# Patient Record
Sex: MALE | Race: WHITE | Employment: FULL TIME
[De-identification: names, ages, dates, MRNs, and addresses within clinical notes are randomized per-mention and may not be internally consistent; named-entity substitution may affect disease eponyms.]

---

## 2022-08-29 ENCOUNTER — OFFICE VISIT (OUTPATIENT)
Dept: URGENT CARE | Facility: CLINIC | Age: 42
End: 2022-08-29
Payer: OTHER GOVERNMENT

## 2022-08-29 VITALS
WEIGHT: 235 LBS | RESPIRATION RATE: 17 BRPM | HEIGHT: 73 IN | DIASTOLIC BLOOD PRESSURE: 70 MMHG | TEMPERATURE: 98.1 F | BODY MASS INDEX: 31.14 KG/M2 | OXYGEN SATURATION: 97 % | HEART RATE: 76 BPM | SYSTOLIC BLOOD PRESSURE: 100 MMHG

## 2022-08-29 DIAGNOSIS — H10.32 ACUTE CONJUNCTIVITIS OF LEFT EYE, UNSPECIFIED ACUTE CONJUNCTIVITIS TYPE: Primary | ICD-10-CM

## 2022-08-29 PROCEDURE — 99213 OFFICE O/P EST LOW 20 MIN: CPT | Performed by: PHYSICIAN ASSISTANT

## 2022-08-29 RX ORDER — OFLOXACIN 3 MG/ML
1 SOLUTION/ DROPS OPHTHALMIC 4 TIMES DAILY
Qty: 5 ML | Refills: 0 | Status: SHIPPED | OUTPATIENT
Start: 2022-08-29 | End: 2022-09-08

## 2022-08-29 ASSESSMENT — ENCOUNTER SYMPTOMS
SORE THROAT: 0
NAUSEA: 0
ABDOMINAL PAIN: 0
VOMITING: 0
SHORTNESS OF BREATH: 0
CHEST TIGHTNESS: 0
DIARRHEA: 0
COUGH: 0

## 2022-08-29 ASSESSMENT — VISUAL ACUITY: OU: 1

## 2022-08-29 NOTE — PROGRESS NOTES
Master Ron (: 1980) is a 35 y.o. male is here for evaluation of the following chief complaint(s):  Chief Complaint   Patient presents with    Conjunctivitis     X 2 day itchy watery eye left eye        ASSESSMENT/PLAN:  Below is the assessment and plan developed based on review of pertinent history, physical exam, labs, studies, and medications. Timothy Fonseca was seen today for conjunctivitis. Diagnoses and all orders for this visit:    Acute conjunctivitis of left eye, unspecified acute conjunctivitis type  -     ofloxacin (OCUFLOX) 0.3 % solution; Place 1 drop into the left eye 4 times daily for 10 days     Ofloxacin drops as prescribed. Patient to avoid wearing contact lenses until symptoms resolve. Symptomatic treatment discussed. Strict ER precautions given. Patient to follow up with PCP as discussed. Patient to return to clinic if symptoms persist or worsen. We discussed reasons to present to the ER or call 911, including but not limited to headache, blurred vision, speech disturbance, difficulty with ambulation/gait, numbness, tingling, weakness, chest pain, shortness of breath, syncope. Patient verbalizes understanding and agreement. SUBJECTIVE/OBJECTIVE:  Patient is a 34 y/o male who presents today endorsing possible pink eye to his left eye. He endorses 2 days itching and drainage. He says he wakes up with his eye crusted shut. He does wear contact lenses that he changes daily. Patient denies blurry vision or loss of vision, pain with extraocular movements. Patient denies fever, chills, chest pain, shortness of breath, nausea, vomiting, diarrhea, abdominal or back pain, lightheadedness, dizziness, weakness. Allergies   Allergen Reactions    Penicillins      No past medical history on file. No past surgical history on file. No family history on file. Social Connections: Not on file          Review of Systems   Constitutional:  Negative for chills and fever. HENT:  Negative for sore throat. Respiratory:  Negative for cough, chest tightness and shortness of breath. Cardiovascular:  Negative for chest pain, palpitations and leg swelling. Gastrointestinal:  Negative for abdominal pain, diarrhea, nausea and vomiting. Skin:  Negative for rash. Neurological:  Negative for dizziness, weakness, light-headedness and headaches. /70 (Site: Right Upper Arm, Position: Sitting, Cuff Size: Large Adult)   Pulse 76   Temp 98.1 °F (36.7 °C) (Oral)   Resp 17   Ht 6' 1\" (1.854 m)   Wt 235 lb (106.6 kg)   SpO2 97%   BMI 31.00 kg/m²      Physical Exam  Constitutional:       Appearance: Normal appearance. HENT:      Head: Normocephalic and atraumatic. Eyes:      General: Lids are normal. Lids are everted, no foreign bodies appreciated. Vision grossly intact. Gaze aligned appropriately. Right eye: No foreign body, discharge or hordeolum. Left eye: Discharge present. No foreign body or hordeolum. Extraocular Movements: Extraocular movements intact. Conjunctiva/sclera:      Right eye: Right conjunctiva is not injected. Left eye: Left conjunctiva is injected. Cardiovascular:      Rate and Rhythm: Normal rate and regular rhythm. Pulses: Normal pulses. Heart sounds: Normal heart sounds. Pulmonary:      Effort: Pulmonary effort is normal.      Breath sounds: Normal breath sounds. Skin:     General: Skin is warm and dry. Neurological:      General: No focal deficit present. Mental Status: He is alert. Psychiatric:         Mood and Affect: Mood normal.         Behavior: Behavior normal.         An electronic signature was used to authenticate this note.   -- VERONIKA Guy

## 2022-11-07 ENCOUNTER — NURSE ONLY (OUTPATIENT)
Dept: PULMONOLOGY | Age: 42
End: 2022-11-07
Payer: OTHER GOVERNMENT

## 2022-11-07 DIAGNOSIS — R05.9 COUGH, UNSPECIFIED TYPE: Primary | ICD-10-CM

## 2022-11-07 LAB
FEV 1 , POC: 4.64 L
FEV1 % PRED, POC: 105 %
FEV1/FVC, POC: 78
FVC % PRED, POC: 108 %
FVC, POC: 5.98
TOTAL HEMOGLOBIN (SPHB), POC: 16.7 G/DL

## 2022-11-07 PROCEDURE — 94060 EVALUATION OF WHEEZING: CPT | Performed by: INTERNAL MEDICINE

## 2022-11-07 PROCEDURE — 94726 PLETHYSMOGRAPHY LUNG VOLUMES: CPT | Performed by: INTERNAL MEDICINE

## 2022-11-07 PROCEDURE — 88738 HGB QUANT TRANSCUTANEOUS: CPT | Performed by: INTERNAL MEDICINE

## 2022-11-07 PROCEDURE — 94729 DIFFUSING CAPACITY: CPT | Performed by: INTERNAL MEDICINE

## 2022-11-07 ASSESSMENT — PULMONARY FUNCTION TESTS
FVC_POC: 5.98
FEV1/FVC_POC: 78
FVC_PERCENT_PREDICTED_POC: 108
FEV1_PERCENT_PREDICTED_POC: 105

## 2023-02-08 LAB
AVERAGE GLUCOSE: NORMAL
HBA1C MFR BLD: 4.7 %

## 2023-03-16 ENCOUNTER — OFFICE VISIT (OUTPATIENT)
Dept: ENT CLINIC | Age: 43
End: 2023-03-16
Payer: OTHER GOVERNMENT

## 2023-03-16 VITALS
SYSTOLIC BLOOD PRESSURE: 122 MMHG | HEIGHT: 73 IN | BODY MASS INDEX: 33.74 KG/M2 | WEIGHT: 254.6 LBS | DIASTOLIC BLOOD PRESSURE: 80 MMHG

## 2023-03-16 DIAGNOSIS — R05.3 CHRONIC COUGH: ICD-10-CM

## 2023-03-16 DIAGNOSIS — J30.1 NON-SEASONAL ALLERGIC RHINITIS DUE TO POLLEN: ICD-10-CM

## 2023-03-16 DIAGNOSIS — R09.82 POSTNASAL DRIP: Primary | ICD-10-CM

## 2023-03-16 PROCEDURE — 31575 DIAGNOSTIC LARYNGOSCOPY: CPT | Performed by: PHYSICIAN ASSISTANT

## 2023-03-16 PROCEDURE — 99204 OFFICE O/P NEW MOD 45 MIN: CPT | Performed by: PHYSICIAN ASSISTANT

## 2023-03-16 RX ORDER — FLUTICASONE PROPIONATE 110 UG/1
1 AEROSOL, METERED RESPIRATORY (INHALATION) DAILY
COMMUNITY

## 2023-03-16 RX ORDER — AZELASTINE HYDROCHLORIDE 137 UG/1
1 SPRAY, METERED NASAL 2 TIMES DAILY
Qty: 1 EACH | Refills: 2 | Status: SHIPPED | OUTPATIENT
Start: 2023-03-16

## 2023-03-16 RX ORDER — TESTOSTERONE CYPIONATE 200 MG/ML
200 INJECTION INTRAMUSCULAR ONCE
COMMUNITY

## 2023-03-16 RX ORDER — ANASTROZOLE 1 MG/1
1 TABLET ORAL DAILY
COMMUNITY

## 2023-03-16 ASSESSMENT — ENCOUNTER SYMPTOMS
COUGH: 0
ABDOMINAL PAIN: 0
EYE DISCHARGE: 0

## 2023-03-16 NOTE — PROGRESS NOTES
Charisma Barrientos is a 38 y.o. male presents today with c/o chronic cough and throat tightness. In January 2020 the patient had a severe upper respiratory infection which included high fevers and the sensation of his lungs being on fire. He had antibodies checked for COVID few months later and was negative. But he had about 2 months of persistent coughing. He was given an inhaler of albuterol which did improve his cough. But ever since every so often it will feel like the back of his throat will tighten as he points under his jaw and feels like it \"is hard to breathe in \"he gets a intermittent but harsh throat clearing cough which will then trigger a scratchy itchy on the back of his uvula which will cause him to cough more. He believes this is from his postnasal drainage. It is not triggered by smells or environmental changes. Warm liquids will help. He largely feels a buildup of phlegm on his larynx. He denies heartburn or regurgitation. He has no dysphagia or dyspnea. He was seen by pulmonary and given pulmonary function tests which the patient states were within normal limits. He was placed on a inhaler, which he uses about 4 times a week in the mornings, advair. He admits to significant nasal allergies and was tested as a child allergic to molds. He has not been tested in recent years and did not do immunotherapy. He does not utilize antihistamines or intranasal steroids. He utilizes albuterol only when sick for severe congestion. As he is a  he keeps close tabs on his head. Patient also recounts perforating what he believes is his left eardrum during a hunting trip to [place] when a stick penetrated his TM. It resolved fully with time. Chief Complaint   Patient presents with    New Patient    Other     Patient here for throat issues. There is no problem list on file for this patient.        Reviewed and updated this visit by provider:  Tobacco  Allergies  Meds  Problems Med Hx  Surg Hx  Fam Hx         Review of Systems   Constitutional:  Negative for fever. HENT:  Negative for ear discharge and ear pain. Eyes:  Negative for discharge. Respiratory:  Negative for cough. Gastrointestinal:  Negative for abdominal pain. Musculoskeletal:  Negative for neck pain. Skin:  Negative for rash. Allergic/Immunologic: Negative for environmental allergies. Neurological:  Negative for dizziness. Hematological:  Negative for adenopathy. /80 (Site: Left Upper Arm, Position: Sitting)   Ht 6' 1\" (1.854 m)   Wt 254 lb 9.6 oz (115.5 kg)   BMI 33.59 kg/m²     Physical Exam:    General: Well developed, well nourished, in no acute distress  Communication: The patient communicates appropriately for their age. Voice: Normal. Pt did not cough during our visit. Head, Face, and Salivary Glands: No head or facial abnormalities present, No masses or lesions present, Overall appearance is normal, No abnormality of parotid or submandibular glands present. TMJ joint: no crepitus, or deviation. External Ears: appearance is normal with no scars, lesions or masses. Right Ear:  Canals is normal , Tympanic membrane with normal landmarks and normal mobility, no retraction, inflammation, or effusion. Left  Ear: Canal is normal , Tympanic membrane with normal landmarks and normal mobility, no retraction, inflammation, or effusion. Nose/Nasal Cavity: Nasal mucosa is pink/ moist.  Nasal septum is deviated left mild with spur mid septum. Inferior turbinates are Hypertrophic. Both nasal passages are clear, no polyps or abnormal drainage. Lips/Gums/Teeth: Inspection of lips, gums and teeth are normal  Oral Cavity: normal oral mucosa, no visualized ulcers, masses or other lesions,  Palate normal, Tongue normal, Floor of mouth normal. Mallampati Class 1 . Oropharynx: Oropharynx normal and unobstructed, tonsils are surgically absent  no lesion or inflammation. Neck/Thyroid: Normal appearance without mass, Trachea midline, No lymphadenopathy, No enlargement, tenderness or mass of thyroid noted. Procedure: Procedure Flex: Diagnostic Flexible Laryngoscopy     Findings:   Nose: normal turbinates and middle meatus bilaterally    Nasopharynx: normal eustachian tube, demarco, and fossa of Rosenmueler bilaterally    Hypopharynx: normal including the vallecula, piriform sinuses, base of tongue, and postcricoid area. no erythema or edema. Glottis and Laryngeal Motor exam:  No evidence of vocal fold weakness. normal abduction, adduction, and stretch. no mucosal lesion. some edema of the  left fvc, but minimal erythema. Subglottis and Trachea:  no visible lesions    Indication: Adequate visualization of the larynx with connected speech not possible without endoscopic evaluation. Consent: The patient verbally consented to the recording of their face and upper aerodigestive tract. Anesthesia: Phenylephrine, Lidocaine    Details: A flexible fiberoptic laryngoscope was passed through the most patent nasal cavity into the nasopharynx which was examined. The scope was then passed into the oropharynx. The hypopharynx, base of tongue, vallecula, epiglottis, aryepiglottic folds, arytenoids, false vocal cords, true vocal cords, subglottic area, pyriform sinuses, and the post cricoid area was examined. Assessment/Plan:  1. Postnasal drip  -     [facility]FL - [facility]  2. Non-seasonal allergic rhinitis due to pollen  -     Azelastine HCl 137 MCG/SPRAY SOLN; 1 spray by Nasal route 2 times daily, Disp-1 each, R-2Normal  -     AFL - [facility]  3. Chronic cough  -     LARYNGOSCOPY,FLEX FIBER,DIAGNOSTIC  -     [facility] - [facility]      Pt has a long history of allergy, he has not been treated with INS or antihistamines in recent months.  He describes a throat closing sensation that happens like tightening under the mandible that was not reproduced on today's visit. He does have Pnd and on examination vc are moving well without LPR findings. Chronic cough is likely allergic and post infection mediated and the advair he is on, has helped. We will add Astelin and antihistamine to his regimen and get allergy tested. I would like to consider speech therapy for his cough if allergy does not help him significantly. Return in about 4 weeks (around 4/13/2023), or recheck cough, pnd, allergies. Patient agrees with this plan.     VERONIKA Gage

## 2023-04-19 ENCOUNTER — OFFICE VISIT (OUTPATIENT)
Dept: ENT CLINIC | Age: 43
End: 2023-04-19

## 2023-04-19 VITALS
DIASTOLIC BLOOD PRESSURE: 80 MMHG | WEIGHT: 249 LBS | BODY MASS INDEX: 33 KG/M2 | SYSTOLIC BLOOD PRESSURE: 110 MMHG | HEIGHT: 73 IN

## 2023-04-19 DIAGNOSIS — R09.82 POSTNASAL DRIP: Primary | Chronic | ICD-10-CM

## 2023-04-19 DIAGNOSIS — K21.9 LARYNGOPHARYNGEAL REFLUX (LPR): ICD-10-CM

## 2023-04-19 DIAGNOSIS — R05.3 CHRONIC COUGH: Chronic | ICD-10-CM

## 2023-04-19 DIAGNOSIS — J30.1 NON-SEASONAL ALLERGIC RHINITIS DUE TO POLLEN: ICD-10-CM

## 2023-04-19 ASSESSMENT — ENCOUNTER SYMPTOMS
ABDOMINAL PAIN: 0
COUGH: 0
EYE DISCHARGE: 0

## 2023-04-19 NOTE — PROGRESS NOTES
aryepiglottic folds, arytenoids, false vocal cords, true vocal cords, subglottic area, pyriform sinuses, and the post cricoid area was examined. Assessment/Plan:  1. Postnasal drip  2. Non-seasonal allergic rhinitis due to pollen  -     [facility] - [facility]  3. Chronic cough  4. Laryngopharyngeal reflux (LPR)    Pt has improved with less pooling and secretions but plenty of AR still present. Recommend continued treatment and follow up for allergy testing. Recommend Pepcid qhs. Return in about 3 months (around 7/19/2023) for recheck allergy. Patient agrees with this plan. VERONIKA Clemons    This note was generated using voice recognition software, please excuse any typos.

## 2023-07-25 ENCOUNTER — OFFICE VISIT (OUTPATIENT)
Dept: ENT CLINIC | Age: 43
End: 2023-07-25
Payer: OTHER GOVERNMENT

## 2023-07-25 VITALS — HEIGHT: 73 IN | OXYGEN SATURATION: 99 % | WEIGHT: 246 LBS | BODY MASS INDEX: 32.6 KG/M2

## 2023-07-25 DIAGNOSIS — R05.3 CHRONIC COUGH: ICD-10-CM

## 2023-07-25 DIAGNOSIS — J30.1 NON-SEASONAL ALLERGIC RHINITIS DUE TO POLLEN: Chronic | ICD-10-CM

## 2023-07-25 DIAGNOSIS — R09.82 POSTNASAL DRIP: Primary | Chronic | ICD-10-CM

## 2023-07-25 PROCEDURE — 99213 OFFICE O/P EST LOW 20 MIN: CPT | Performed by: PHYSICIAN ASSISTANT

## 2023-07-25 ASSESSMENT — ENCOUNTER SYMPTOMS
ABDOMINAL PAIN: 0
EYE DISCHARGE: 0
COUGH: 0

## 2023-09-07 ENCOUNTER — APPOINTMENT (RX ONLY)
Dept: URBAN - METROPOLITAN AREA CLINIC 329 | Facility: CLINIC | Age: 43
Setting detail: DERMATOLOGY
End: 2023-09-07

## 2023-09-07 DIAGNOSIS — L81.4 OTHER MELANIN HYPERPIGMENTATION: ICD-10-CM

## 2023-09-07 DIAGNOSIS — L71.8 OTHER ROSACEA: ICD-10-CM | Status: INADEQUATELY CONTROLLED

## 2023-09-07 DIAGNOSIS — L82.1 OTHER SEBORRHEIC KERATOSIS: ICD-10-CM

## 2023-09-07 DIAGNOSIS — D22 MELANOCYTIC NEVI: ICD-10-CM

## 2023-09-07 DIAGNOSIS — B36.0 PITYRIASIS VERSICOLOR: ICD-10-CM | Status: INADEQUATELY CONTROLLED

## 2023-09-07 PROBLEM — D22.71 MELANOCYTIC NEVI OF RIGHT LOWER LIMB, INCLUDING HIP: Status: ACTIVE | Noted: 2023-09-07

## 2023-09-07 PROBLEM — D22.5 MELANOCYTIC NEVI OF TRUNK: Status: ACTIVE | Noted: 2023-09-07

## 2023-09-07 PROCEDURE — ? TREATMENT REGIMEN

## 2023-09-07 PROCEDURE — ? COUNSELING

## 2023-09-07 PROCEDURE — ? PRESCRIPTION

## 2023-09-07 PROCEDURE — 99204 OFFICE O/P NEW MOD 45 MIN: CPT

## 2023-09-07 PROCEDURE — ? FULL BODY SKIN EXAM

## 2023-09-07 PROCEDURE — ? ADDITIONAL NOTES

## 2023-09-07 PROCEDURE — ? PRESCRIPTION MEDICATION MANAGEMENT

## 2023-09-07 PROCEDURE — ? MDM - TREATMENT GOALS

## 2023-09-07 RX ORDER — METRONIDAZOLE 7.5 MG/G
GEL TOPICAL
Qty: 45 | Refills: 3 | Status: ERX | COMMUNITY
Start: 2023-09-07

## 2023-09-07 RX ADMIN — METRONIDAZOLE: 7.5 GEL TOPICAL at 00:00

## 2023-09-07 ASSESSMENT — LOCATION DETAILED DESCRIPTION DERM
LOCATION DETAILED: LEFT INFERIOR MEDIAL MIDBACK
LOCATION DETAILED: NASAL TIP
LOCATION DETAILED: RIGHT ANTERIOR PROXIMAL THIGH
LOCATION DETAILED: LEFT ANTERIOR DISTAL THIGH
LOCATION DETAILED: RIGHT MEDIAL SUPERIOR CHEST
LOCATION DETAILED: LEFT INFERIOR UPPER BACK
LOCATION DETAILED: RIGHT LATERAL ABDOMEN
LOCATION DETAILED: LEFT SUPERIOR UPPER BACK

## 2023-09-07 ASSESSMENT — LOCATION SIMPLE DESCRIPTION DERM
LOCATION SIMPLE: RIGHT THIGH
LOCATION SIMPLE: NOSE
LOCATION SIMPLE: LEFT THIGH
LOCATION SIMPLE: ABDOMEN
LOCATION SIMPLE: LEFT UPPER BACK
LOCATION SIMPLE: CHEST
LOCATION SIMPLE: LEFT LOWER BACK

## 2023-09-07 ASSESSMENT — LOCATION ZONE DERM
LOCATION ZONE: LEG
LOCATION ZONE: NOSE
LOCATION ZONE: TRUNK

## 2023-09-07 NOTE — PROCEDURE: PRESCRIPTION MEDICATION MANAGEMENT
Detail Level: Zone
Render In Strict Bullet Format?: No
Initiate Treatment: Metronidazole gel twice daily \\nSunscreen daily

## 2023-09-07 NOTE — HPI: SKIN LESION
How Severe Is Your Skin Lesion?: mild
Is This A New Presentation, Or A Follow-Up?: Skin Lesions
Which Family Member (Optional)?: Father
Additional History: Patient states he has several red bumps that have appeared on his nose over the years. He denies any changes or bleeding from the lesions.

## 2023-09-07 NOTE — HPI: RASH
How Severe Is Your Rash?: moderate
Is This A New Presentation, Or A Follow-Up?: Rash
Additional History: Patient states he has a rash on his back that comes and goes. He states it is discolored and itches at times.

## 2023-09-07 NOTE — PROCEDURE: ADDITIONAL NOTES
Render Risk Assessment In Note?: no
Detail Level: Simple
Additional Notes: Patient would like to have the red lesions/ discoloration removed. Suggested patient set up a consultation with Nina.
Additional Notes: Discussed treatment options. Offered patient a medicated shampoo but patient declines at this time.

## 2023-09-07 NOTE — PROCEDURE: MIPS QUALITY
Detail Level: Detailed
Quality 431: Preventive Care And Screening: Unhealthy Alcohol Use - Screening: Patient not identified as an unhealthy alcohol user when screened for unhealthy alcohol use using a systematic screening method
Quality 226: Preventive Care And Screening: Tobacco Use: Screening And Cessation Intervention: Patient screened for tobacco use and is an ex/non-smoker
Quality 486: Dermatitis - Improvement In Patient-Reported Itch Severity: Itch severity assessment score was not reduced by at least 2 points from the initial (index) score to the follow-up visit score or assessment was not completed during the follow-up encounter